# Patient Record
Sex: FEMALE | Race: WHITE
[De-identification: names, ages, dates, MRNs, and addresses within clinical notes are randomized per-mention and may not be internally consistent; named-entity substitution may affect disease eponyms.]

---

## 2019-02-18 ENCOUNTER — HOSPITAL ENCOUNTER (EMERGENCY)
Dept: HOSPITAL 80 - FED | Age: 69
Discharge: HOME | End: 2019-02-18
Payer: COMMERCIAL

## 2019-02-18 VITALS — SYSTOLIC BLOOD PRESSURE: 153 MMHG | DIASTOLIC BLOOD PRESSURE: 85 MMHG

## 2019-02-18 DIAGNOSIS — K52.9: Primary | ICD-10-CM

## 2019-02-18 DIAGNOSIS — E86.9: ICD-10-CM

## 2019-02-18 PROCEDURE — 96361 HYDRATE IV INFUSION ADD-ON: CPT

## 2019-02-18 PROCEDURE — 96374 THER/PROPH/DIAG INJ IV PUSH: CPT

## 2019-02-18 PROCEDURE — 99284 EMERGENCY DEPT VISIT MOD MDM: CPT

## 2019-02-18 PROCEDURE — 96376 TX/PRO/DX INJ SAME DRUG ADON: CPT

## 2019-02-18 NOTE — EDPHY
H & P


Stated Complaint: VOMIT/DIARRHEA SINCE THIS AM, ABD PAIN AND HA


Time Seen by Provider: 02/18/19 18:13


HPI/ROS: 





CHIEF COMPLAINT:  Nausea, vomiting, diarrhea





HISTORY OF PRESENT ILLNESS:  This is a generally healthy 68-year-old female who 

who became ill in the early hours of the morning today.  She has had persistent 

nausea, vomiting, diarrhea throughout the day.  She reports at least 4 bouts of 

vomiting and 4 bouts of nonbloody diarrhea. She has not been able to retain any 

food or fluid today.  She has had some chills and thinks that perhaps she had a 

fever.  Last night she ate shrimp that had been purchased 4 days ago but she 

has no reason to believe that it had gone bad.  No ill contacts.  No recent 

travel.





REVIEW OF SYSTEMS:


A ten system review of systems was performed and is negative with the exception 

of the items mentioned in the HPI.





Past medical history:


1. Hypothyroidism


2.  Hyperlipidemia





Past surgical history:


Knee surgery








Social history:  She lives with her .  She does not use tobacco 

products.  She is retired  in middle school.





General Appearance:  Alert.  Vital signs reviewed.  Blood pressure 151/112.


Eyes:  Pupils equal and round, no conjunctival injection, no discharge. 

Anicteric.


ENT, Mouth:  Mucous membranes are dry, no oropharyngeal erythema or edema.


Neck:  No lymphadenopathy, supple.


Respiratory:  Lungs are clear to auscultation; no wheezes, rales, or rhonchi.


Cardiovascular:  Regular rate and rhythm; no murmur, rub, or gallop.


Gastrointestinal:  Abdomen is soft and nontender, no masses or organomegaly, 

bowel sounds normal.


Skin:  Warm and dry, no rashes on exposed skin, normal color.


Back:  Nontender to palpation over the thoracolumbar spine. No CVAT.


Extremities:  No lower extremity edema, no calf tenderness or swelling.


Neurological:  Alert and oriented.  Moving all four extremities easily and 

equally.


Psychiatric:  Normal affect.





- Personal History


Current Tetanus/Diphtheria Vaccine: Yes





- Medical/Surgical History


Hx Asthma: Yes


Hx Chronic Respiratory Disease: No


Hx Diabetes: No


Hx Cardiac Disease: No


Hx Renal Disease: No


Hx Cirrhosis: No


Hx Alcoholism: No


Hx HIV/AIDS: No


Hx Splenectomy or Spleen Trauma: No


Other PMH: KNEE SURG





- Social History


Smoking Status: Never smoked


Constitutional: 


 Initial Vital Signs











Temperature (C)  37.1 C   02/18/19 17:20


 


Heart Rate  97   02/18/19 17:20


 


Respiratory Rate  18   02/18/19 17:20


 


Blood Pressure  151/112 H  02/18/19 17:20


 


O2 Sat (%)  96   02/18/19 17:20








 











O2 Delivery Mode               Room Air














Allergies/Adverse Reactions: 


 





Penicillins Allergy (Verified 02/18/19 17:19)


 








Home Medications: 














 Medication  Instructions  Recorded


 


SIMVASTATIN  02/18/19


 


Synthroid  02/18/19














Medical Decision Making


ED Course/Re-evaluation: 





1 L IV normal saline and Zofran 4 mg IV ordered shortly after her arrival.





1930: Reevaluated patient. She is feeling improved, but still nauseated. 1L IV 

NS and 4mg IV Zofran ordered. 





Patient is feeling improved on reassessment. Will attempt PO trial.  Abdomen 

soft and nontender.





Patient is tolerating PO fluids without issue. She feels ready for discharge 

home.  She did report a mild headache that she thinks is due to the fact that 

she has had no caffeine today.  Tylenol 650 mg p.o. Given.  She will be given a 

prepack for Zofran along with standard gastroenteritis care and follow up 

instructions. Return precautions discussed. 





BP high while in ED.  Patient aware, will FU with PCP.


Differential Diagnosis: 





I considered a differential diagnosis that includes but is not limited to 

gastroenteritis including food poisoning, gastritis, pancreatitis, cholecystitis

, and appendicitis.





- Data Points


Medications Given: 


 








Discontinued Medications





Acetaminophen (Tylenol)  650 mg PO EDNOW ONE


   Stop: 02/18/19 21:07


   Last Admin: 02/18/19 21:10 Dose:  650 mg


Sodium Chloride (Ns)  1,000 mls @ 0 mls/hr IV ONCE ONE


   PRN Reason: Wide Open


   Stop: 02/18/19 18:19


   Last Admin: 02/18/19 18:22 Dose:  1,000 mls


Sodium Chloride (Ns)  1,000 mls @ 0 mls/hr IV EDNOW ONE; Wide Open


   PRN Reason: Protocol


   Stop: 02/18/19 19:32


   Last Admin: 02/18/19 19:42 Dose:  1,000 mls


Ondansetron HCl (Zofran)  4 mg IVP EDNOW ONE


   Stop: 02/18/19 18:19


   Last Admin: 02/18/19 18:22 Dose:  4 mg


Ondansetron HCl (Zofran)  4 mg IVP EDNOW ONE


   Stop: 02/18/19 19:32


   Last Admin: 02/18/19 19:42 Dose:  4 mg


Ondansetron HCl (Zofran Odt 4 Mg Prepack#2)  1 btl TAKEHOME EDNOW ONE


   Stop: 02/18/19 20:50


   Last Admin: 02/18/19 21:10 Dose:  1 btl








Departure





- Departure


Disposition: Home, Routine, Self-Care


Clinical Impression: 


 Acute gastroenteritis





Condition: Good


Instructions:  Ondansetron (By mouth), Gastroenteritis (ED)


Additional Instructions: 


1. Take Zofran as prescribed as needed for nausea and vomiting.


2. Increase fluid intake as tolerated. 


3. Follow up with your primary care provider for unimproved symptoms over the 

next 2-3 days.


4. Return to the ED for worsening of condition. 


Referrals: 


Rob Mane MD [Primary Care Provider] - As per Instructions


Physician Review and Approval Statement: 





02/18/19 18:13


Portions of this note were transcribed by the medical scribe.  I, Dr. Maryam Egan, personally performed the history, physical exam, and medical decision-

making; and confirmed the accuracy of the information in the transcribed note.